# Patient Record
Sex: MALE | Race: OTHER | RURAL
[De-identification: names, ages, dates, MRNs, and addresses within clinical notes are randomized per-mention and may not be internally consistent; named-entity substitution may affect disease eponyms.]

---

## 2017-01-05 DIAGNOSIS — Z00.5 TRANSPLANT DONOR EVALUATION: Primary | ICD-10-CM

## 2019-05-01 NOTE — PATIENT DISCUSSION
Stopped Today: Acular (ketorolac): drops: 0.5% 1 drop four times a day as directed into affected eye 11-

## 2019-05-01 NOTE — PATIENT DISCUSSION
Stopped Today: prednisolone acetate (prednisolone acetate): drops,suspension: 1% 1 drop four times a day into affected eye 11-

## 2019-05-01 NOTE — PATIENT DISCUSSION
Stopped Today: Ocuflox (ofloxacin): drops: 0.3% 1 drop four times a day as directed into affected eye 11-

## 2019-05-01 NOTE — PATIENT DISCUSSION
CATARACTS, OS: VISUALLY SIGNIFICANT. OPTION OF SURGERY VERSUS FOLLOWING VERSUS UPDATING GLASSES DISCUSSED. RBA'S DISCUSSED, PATIENT UNDERSTANDS AND DESIRES SURGERY TO INCREASE VISION FOR DRIVING, READING, WATCHING TV, GLARE AND HUNTING. SCHEDULE CATARACT SURGERY/PRE-OP OS.
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
## 2020-12-23 NOTE — PATIENT DISCUSSION
POSTERIOR CAPSULAR FIBROSIS, OD:  VISUALLY SIGNIFICANT. OPTION OF YAG LASER VERSUS UPDATING GLASSES VERSUS FOLLOWING DISCUSSED. RBA'S DISCUSSED, PATIENT UNDERSTANDS AND DESIRES YAG LASER TO INCREASE VISION FOR DRIVING, WATCHING TV, READING AND TO REDUCE GLARE IN ORDER TO DRIVE SAFELY AT NIGHT. SCHEDULE YAG LASER OD.

## 2021-02-10 NOTE — PATIENT DISCUSSION
Continue: Pred Forte (prednisolone acetate): drops,suspension: 1% 1 drop four times a day into affected eye 01-

## 2021-02-10 NOTE — PATIENT DISCUSSION
S/P PCIOL OS: INTRAOCULAR PRESSURE IS ELEVATED. INSTILL ONE DROP EACH OF ALPHAGAN AND LUMIGAN IN POSTOPERATIVE EYE. PRESSURE DOWN TO 23. RETURN FOR FOLLOW-UP IN 2 WEEKS.

## 2022-08-01 ENCOUNTER — NEW PATIENT (OUTPATIENT)
Dept: RURAL CLINIC 1 | Facility: CLINIC | Age: 67
End: 2022-08-01

## 2022-08-01 DIAGNOSIS — Z98.890: ICD-10-CM

## 2022-08-01 DIAGNOSIS — H25.13: ICD-10-CM

## 2022-08-01 PROCEDURE — 92004 COMPRE OPH EXAM NEW PT 1/>: CPT

## 2022-08-01 ASSESSMENT — VISUAL ACUITY
OD_SC: 20/30-2
OS_SC: 20/25-2

## 2022-08-01 ASSESSMENT — TONOMETRY
OD_IOP_MMHG: 12
OS_IOP_MMHG: 12

## 2023-10-03 NOTE — PATIENT DISCUSSION
S/P PC IOL, OS. DOING WELL. CONTINUE DROPS AS DIRECTED. SPECS RX OFFERED. RX ARC IN SPECS TO MINIMIZE GLARE. RETURN FOR FOLLOW-UP AS SCHEDULED. Never smoker

## 2023-11-13 ENCOUNTER — COMPREHENSIVE EXAM (OUTPATIENT)
Dept: RURAL CLINIC 1 | Facility: CLINIC | Age: 68
End: 2023-11-13

## 2023-11-13 DIAGNOSIS — H25.13: ICD-10-CM

## 2023-11-13 DIAGNOSIS — Z98.890: ICD-10-CM

## 2023-11-13 PROCEDURE — 92014 COMPRE OPH EXAM EST PT 1/>: CPT

## 2023-11-13 ASSESSMENT — VISUAL ACUITY
OU_SC: 20/20
OD_SC: 20/40
OS_SC: 20/20
OU_SC: 20/30+2
OD_SC: 20/20
OS_SC: 20/30

## 2023-11-13 ASSESSMENT — TONOMETRY
OS_IOP_MMHG: 13
OD_IOP_MMHG: 13

## 2024-11-14 ENCOUNTER — FOLLOW UP (OUTPATIENT)
Dept: RURAL CLINIC 1 | Facility: CLINIC | Age: 69
End: 2024-11-14

## 2024-11-14 DIAGNOSIS — Z98.890: ICD-10-CM

## 2024-11-14 DIAGNOSIS — H25.13: ICD-10-CM

## 2024-11-14 PROCEDURE — 92014 COMPRE OPH EXAM EST PT 1/>: CPT

## 2025-04-21 ENCOUNTER — EMERGENCY VISIT (OUTPATIENT)
Age: 70
End: 2025-04-21

## 2025-04-21 DIAGNOSIS — H43.812: ICD-10-CM

## 2025-04-21 DIAGNOSIS — Z98.890: ICD-10-CM

## 2025-04-21 DIAGNOSIS — H25.13: ICD-10-CM

## 2025-04-21 PROCEDURE — 92014 COMPRE OPH EXAM EST PT 1/>: CPT
